# Patient Record
Sex: MALE | Race: WHITE | Employment: OTHER | URBAN - METROPOLITAN AREA
[De-identification: names, ages, dates, MRNs, and addresses within clinical notes are randomized per-mention and may not be internally consistent; named-entity substitution may affect disease eponyms.]

---

## 2017-07-03 ENCOUNTER — HOSPITAL ENCOUNTER (OUTPATIENT)
Dept: CT IMAGING | Age: 82
Discharge: HOME | End: 2017-07-03
Attending: INTERNAL MEDICINE
Payer: MEDICARE

## 2017-07-03 VITALS — HEIGHT: 73 IN | BODY MASS INDEX: 24.78 KG/M2 | WEIGHT: 187 LBS

## 2017-07-03 DIAGNOSIS — I71.21 ASCENDING AORTIC ANEURYSM: ICD-10-CM

## 2017-07-03 LAB — CREAT BLD-MCNC: 1 MG/DL (ref 0.6–1)

## 2017-07-03 PROCEDURE — 71275 CT ANGIOGRAPHY CHEST: CPT

## 2017-07-03 PROCEDURE — 74011000258 HC RX REV CODE- 258: Performed by: INTERNAL MEDICINE

## 2017-07-03 PROCEDURE — 82565 ASSAY OF CREATININE: CPT

## 2017-07-03 PROCEDURE — 74011636320 HC RX REV CODE- 636/320: Performed by: INTERNAL MEDICINE

## 2017-07-03 RX ORDER — SODIUM CHLORIDE 0.9 % (FLUSH) 0.9 %
10 SYRINGE (ML) INJECTION
Status: COMPLETED | OUTPATIENT
Start: 2017-07-03 | End: 2017-07-03

## 2017-07-03 RX ADMIN — IOPAMIDOL 125 ML: 755 INJECTION, SOLUTION INTRAVENOUS at 08:30

## 2017-07-03 RX ADMIN — SODIUM CHLORIDE 100 ML: 900 INJECTION, SOLUTION INTRAVENOUS at 08:30

## 2017-07-03 RX ADMIN — Medication 10 ML: at 08:30

## 2017-08-31 PROBLEM — Z01.818 PRE-OP TESTING: Status: ACTIVE | Noted: 2017-08-31

## 2017-09-07 ENCOUNTER — HOSPITAL ENCOUNTER (OUTPATIENT)
Dept: CARDIAC CATH/INVASIVE PROCEDURES | Age: 82
Discharge: HOME | End: 2017-09-07
Attending: INTERNAL MEDICINE | Admitting: INTERNAL MEDICINE
Payer: MEDICARE

## 2017-09-07 VITALS
SYSTOLIC BLOOD PRESSURE: 124 MMHG | BODY MASS INDEX: 25.18 KG/M2 | DIASTOLIC BLOOD PRESSURE: 75 MMHG | RESPIRATION RATE: 16 BRPM | HEART RATE: 81 BPM | HEIGHT: 73 IN | WEIGHT: 190 LBS | OXYGEN SATURATION: 94 % | TEMPERATURE: 98.4 F

## 2017-09-07 PROBLEM — I71.20 THORACIC AORTIC ANEURYSM WITHOUT RUPTURE: Status: ACTIVE | Noted: 2017-09-07

## 2017-09-07 LAB
ANION GAP SERPL CALC-SCNC: 6 MMOL/L (ref 7–16)
ATRIAL RATE: 82 BPM
BUN SERPL-MCNC: 20 MG/DL (ref 8–23)
CALCIUM SERPL-MCNC: 8.8 MG/DL (ref 8.3–10.4)
CALCULATED R AXIS, ECG10: -83 DEGREES
CALCULATED T AXIS, ECG11: 90 DEGREES
CHLORIDE SERPL-SCNC: 105 MMOL/L (ref 98–107)
CO2 SERPL-SCNC: 28 MMOL/L (ref 21–32)
CREAT SERPL-MCNC: 0.95 MG/DL (ref 0.8–1.5)
DIAGNOSIS, 93000: NORMAL
ERYTHROCYTE [DISTWIDTH] IN BLOOD BY AUTOMATED COUNT: 16.7 % (ref 11.9–14.6)
GLUCOSE SERPL-MCNC: 101 MG/DL (ref 65–100)
HCT VFR BLD AUTO: 34.8 % (ref 41.1–50.3)
HGB BLD-MCNC: 12.2 G/DL (ref 13.6–17.2)
INR PPP: 1.2 (ref 0.9–1.2)
MAGNESIUM SERPL-MCNC: 2 MG/DL (ref 1.8–2.4)
MCH RBC QN AUTO: 32 PG (ref 26.1–32.9)
MCHC RBC AUTO-ENTMCNC: 35.1 G/DL (ref 31.4–35)
MCV RBC AUTO: 91.3 FL (ref 79.6–97.8)
P-R INTERVAL, ECG05: 248 MS
PLATELET # BLD AUTO: 184 K/UL (ref 150–450)
PMV BLD AUTO: 9.4 FL (ref 10.8–14.1)
POTASSIUM SERPL-SCNC: 3.8 MMOL/L (ref 3.5–5.1)
PROTHROMBIN TIME: 13.5 SEC (ref 9.6–12)
Q-T INTERVAL, ECG07: 480 MS
QRS DURATION, ECG06: 174 MS
QTC CALCULATION (BEZET), ECG08: 560 MS
RBC # BLD AUTO: 3.81 M/UL (ref 4.23–5.67)
SODIUM SERPL-SCNC: 139 MMOL/L (ref 136–145)
VENTRICULAR RATE, ECG03: 82 BPM
WBC # BLD AUTO: 4.1 K/UL (ref 4.3–11.1)

## 2017-09-07 PROCEDURE — 77030029997 HC DEV COM RDL R BND TELE -B

## 2017-09-07 PROCEDURE — C1769 GUIDE WIRE: HCPCS

## 2017-09-07 PROCEDURE — 77030004559 HC CATH ANGI DX SUPT CARD -B

## 2017-09-07 PROCEDURE — 74011250637 HC RX REV CODE- 250/637: Performed by: INTERNAL MEDICINE

## 2017-09-07 PROCEDURE — 74011000250 HC RX REV CODE- 250: Performed by: INTERNAL MEDICINE

## 2017-09-07 PROCEDURE — 85610 PROTHROMBIN TIME: CPT | Performed by: INTERNAL MEDICINE

## 2017-09-07 PROCEDURE — 93458 L HRT ARTERY/VENTRICLE ANGIO: CPT

## 2017-09-07 PROCEDURE — 99152 MOD SED SAME PHYS/QHP 5/>YRS: CPT

## 2017-09-07 PROCEDURE — 74011636320 HC RX REV CODE- 636/320: Performed by: INTERNAL MEDICINE

## 2017-09-07 PROCEDURE — 77030004534 HC CATH ANGI DX INFN CARD -A

## 2017-09-07 PROCEDURE — 83735 ASSAY OF MAGNESIUM: CPT | Performed by: INTERNAL MEDICINE

## 2017-09-07 PROCEDURE — 93005 ELECTROCARDIOGRAM TRACING: CPT | Performed by: INTERNAL MEDICINE

## 2017-09-07 PROCEDURE — 74011250636 HC RX REV CODE- 250/636: Performed by: INTERNAL MEDICINE

## 2017-09-07 PROCEDURE — 74011250636 HC RX REV CODE- 250/636

## 2017-09-07 PROCEDURE — C1760 CLOSURE DEV, VASC: HCPCS

## 2017-09-07 PROCEDURE — 76937 US GUIDE VASCULAR ACCESS: CPT

## 2017-09-07 PROCEDURE — 77030013687 HC GD NDL BARD -B

## 2017-09-07 PROCEDURE — 85027 COMPLETE CBC AUTOMATED: CPT | Performed by: INTERNAL MEDICINE

## 2017-09-07 PROCEDURE — C1894 INTRO/SHEATH, NON-LASER: HCPCS

## 2017-09-07 PROCEDURE — 99153 MOD SED SAME PHYS/QHP EA: CPT

## 2017-09-07 PROCEDURE — 77030004558 HC CATH ANGI DX SUPR TORQ CARD -A

## 2017-09-07 PROCEDURE — 80048 BASIC METABOLIC PNL TOTAL CA: CPT | Performed by: INTERNAL MEDICINE

## 2017-09-07 RX ORDER — DIAZEPAM 5 MG/1
5 TABLET ORAL ONCE
Status: DISCONTINUED | OUTPATIENT
Start: 2017-09-07 | End: 2017-09-07 | Stop reason: HOSPADM

## 2017-09-07 RX ORDER — HEPARIN SODIUM 200 [USP'U]/100ML
3 INJECTION, SOLUTION INTRAVENOUS CONTINUOUS
Status: DISCONTINUED | OUTPATIENT
Start: 2017-09-07 | End: 2017-09-07

## 2017-09-07 RX ORDER — FENTANYL CITRATE 50 UG/ML
25-100 INJECTION, SOLUTION INTRAMUSCULAR; INTRAVENOUS
Status: DISCONTINUED | OUTPATIENT
Start: 2017-09-07 | End: 2017-09-07

## 2017-09-07 RX ORDER — MIDAZOLAM HYDROCHLORIDE 1 MG/ML
.5-2 INJECTION, SOLUTION INTRAMUSCULAR; INTRAVENOUS
Status: DISCONTINUED | OUTPATIENT
Start: 2017-09-07 | End: 2017-09-07

## 2017-09-07 RX ORDER — GUAIFENESIN 100 MG/5ML
324 LIQUID (ML) ORAL ONCE
Status: COMPLETED | OUTPATIENT
Start: 2017-09-07 | End: 2017-09-07

## 2017-09-07 RX ORDER — SODIUM CHLORIDE 9 MG/ML
75 INJECTION, SOLUTION INTRAVENOUS CONTINUOUS
Status: DISCONTINUED | OUTPATIENT
Start: 2017-09-07 | End: 2017-09-07 | Stop reason: HOSPADM

## 2017-09-07 RX ORDER — LIDOCAINE HYDROCHLORIDE 20 MG/ML
60-200 INJECTION, SOLUTION INFILTRATION; PERINEURAL
Status: DISCONTINUED | OUTPATIENT
Start: 2017-09-07 | End: 2017-09-07

## 2017-09-07 RX ADMIN — LIDOCAINE HYDROCHLORIDE 60 MG: 20 INJECTION, SOLUTION INFILTRATION; PERINEURAL at 13:11

## 2017-09-07 RX ADMIN — SODIUM CHLORIDE 75 ML/HR: 900 INJECTION, SOLUTION INTRAVENOUS at 10:01

## 2017-09-07 RX ADMIN — IOPAMIDOL 150 ML: 755 INJECTION, SOLUTION INTRAVENOUS at 13:51

## 2017-09-07 RX ADMIN — ASPIRIN 81 MG 324 MG: 81 TABLET ORAL at 10:04

## 2017-09-07 RX ADMIN — HEPARIN SODIUM 3 UNITS/HR: 200 INJECTION, SOLUTION INTRAVENOUS at 13:07

## 2017-09-07 RX ADMIN — LIDOCAINE HYDROCHLORIDE 200 MG: 20 INJECTION, SOLUTION INFILTRATION; PERINEURAL at 13:23

## 2017-09-07 RX ADMIN — MIDAZOLAM HYDROCHLORIDE 2 MG: 1 INJECTION, SOLUTION INTRAMUSCULAR; INTRAVENOUS at 13:06

## 2017-09-07 RX ADMIN — FENTANYL CITRATE 25 MCG: 50 INJECTION, SOLUTION INTRAMUSCULAR; INTRAVENOUS at 13:06

## 2017-09-07 RX ADMIN — MIDAZOLAM HYDROCHLORIDE 2 MG: 1 INJECTION, SOLUTION INTRAMUSCULAR; INTRAVENOUS at 13:22

## 2017-09-07 RX ADMIN — HEPARIN SODIUM 2 ML: 10000 INJECTION, SOLUTION INTRAVENOUS; SUBCUTANEOUS at 13:12

## 2017-09-07 RX ADMIN — FENTANYL CITRATE 25 MCG: 50 INJECTION, SOLUTION INTRAMUSCULAR; INTRAVENOUS at 13:23

## 2017-09-07 RX ADMIN — HEPARIN SODIUM 3 UNITS/HR: 200 INJECTION, SOLUTION INTRAVENOUS at 13:06

## 2017-09-07 NOTE — PROCEDURES
Brief Cardiac Procedure Note    Patient: Marco Siddiqi MRN: [id number]  SSN: [id number]    YOB: 1935  Age: 80 y.o. Sex: male      Date of Procedure: 9/7/2017     Pre-procedure Diagnosis: Aortic aneurysm. Post-procedure Diagnosis: Mild non-obstructive Coronary Artery Disease    Procedure: Left Heart Catheterization    Brief Description of Procedure: As above    Performed By: Zeyad Mohan MD     Assistants: None    Anesthesia: Moderate Sedation    Estimated Blood Loss: Less than 10 mL      Specimens: None    Implants: None    Findings: Mild non-obstructive CAD. EF 40%. Complications: None    Recommendations: Continue medical therapy.     Signed By: Zeyad Mohan MD     September 7, 2017

## 2017-09-07 NOTE — DISCHARGE INSTRUCTIONS
HEART CATHETERIZATION/ANGIOGRAPHY DISCHARGE INSTRUCTIONS    1. Check puncture site frequently for swelling or bleeding. If there is any bleeding, lie down and apply pressure over the area with a clean towel or washcloth and call [phone]. Notify your doctor for any redness, swelling, drainage, or oozing from the puncture site. Notify your doctor for any fever or chills. 2. If the extremity becomes cold, numb, or painful call [facility] Cardiology at [phone].  3. Activity should be limited for the next 48 hours. Climb stairs as little as possible and avoid any stooping, bending, or strenuous activity for 48 hours. No heavy lifting (anything over 10 pounds) for 3 days. 4. You may resume your usual diet. Drink more fluids than usual.  5. Have a responsible person drive you home and stay with you for at least 24 hours after your heart catheterization/angiography. 6. You may remove bandage from your Right wrist and groin in 24 hours. You may shower in 24 hours. No tub baths, hot tubs, or swimming for 1 week. Do not place any lotions, creams, powders, or ointments over puncture site for 1 week. You may place a clean band-aid over the puncture site each day for 5 days. Change daily. Follow up with Dr. Elissa George at [facility] office on October 4th at [address], please call [phone] if any issues with date or time of appointment. I have read the above instructions and have had the opportunity to ask questions.       Patient: ________________________   Date: 9/7/2017    Witness: _______________________   Date: 9/7/2017

## 2017-09-07 NOTE — H&P
[facility] CARDIOLOGY History & Physical                   Subjective:     Patient is a 80 y.o. male who presents for coronary angiogram.  Patient has been followed by Dr. Elier Dunham. He has a history of prior mitral valve replacement. Workup has revealed significant tricuspid regurgitation and a thoracic aortic aneurysm measuring 6.3 cm. He has been seen at [facility] by Dr. Antony Chavez and is planned to have tricuspid valve repair and aortic aneurysm repair later this month. Preoperative cardiac catheterization has been requested. He does have LV dysfunction and a prior biventricular ICD. He currently denies any chest pain or dyspnea. Past Medical History:   Diagnosis Date    Arthritis     Atrial septal defect/Patent Foramen Ovale 1/11/2016    Cardiac pacemaker 1/11/2016    High-degree AV block with high percentage of right ventricular pacing on pacemaker.      Cardiomyopathy ([facility] 1/11/2016    Chronic systolic congestive heart failure ([facility] 1/11/2016    NYHA III; diagnosed 10/14/15; 1/25/16 EF 30-35%, LVd 6.3cm; mild AI    Fatigue     HLD (hyperlipidemia) 1/11/2016    Permanent atrial fibrillation ([facility] 1/11/2016    Stroke [facility])     TIA- 1990's    Thoracic aortic aneurysm without rupture ([facility] 9/7/2017    Ventricular premature beats, Depolarization 1/11/2016      Past Surgical History:   Procedure Laterality Date    HX HEART CATHETERIZATION Left     No significant CAD    HX MITRAL VALVE REPLACEMENT  2011    bioprosthetic with MAZE and JENNIFER ligation    HX ORTHOPAEDIC Left     Shoulder surgery    HX OTHER SURGICAL  1/2011    s/p Maze, JENNIFER ligation, and PFO closure    HX PACEMAKER  1/24/2011    PM 2011; upgrade to [facility]. Zane BiV ICD 2/16/16      No Known Allergies  Social History   Substance Use Topics    Smoking status: Former Smoker     Years: 15.00    Smokeless tobacco: Never Used    Alcohol use No      Comment: Approx 2 drinks per day (refraining from alcohol for 3 months)      FH:   Family History Problem Relation Age of Onset    Arrhythmia Mother      Afib    Heart Disease Mother     No Known Problems Father         Review of Systems   Constitutional: Negative for chills and fever. HENT: Negative for tinnitus. Eyes: Negative for blurred vision. Respiratory: Negative for cough and shortness of breath. Cardiovascular: Negative for orthopnea and leg swelling. Gastrointestinal: Negative for abdominal pain and nausea. Genitourinary: Negative for dysuria. Musculoskeletal: Positive for joint pain. Skin: Negative for rash. Neurological: Negative for tremors, sensory change and headaches. Endo/Heme/Allergies: Does not bruise/bleed easily. Psychiatric/Behavioral: Negative for depression and suicidal ideas. Objective:       Visit Vitals    BP (!) 148/119 (BP Patient Position: At rest)    Pulse 82    Temp 98.4 °F (36.9 °C)    Resp 16    Ht 6' 1\" (1.854 m)    Wt 86.2 kg (190 lb)    SpO2 96%    BMI 25.07 kg/m2               Physical Exam   Constitutional: He is oriented to person, place, and time and well-developed, well-nourished, and in no distress. HENT:   Head: Atraumatic. Eyes: Conjunctivae are normal. Pupils are equal, round, and reactive to light. Neck: No JVD present. No thyromegaly present. Cardiovascular: Normal rate and regular rhythm. Murmur heard. Pulmonary/Chest: Effort normal and breath sounds normal. Wheezes: Grade II/VI LOLITA. Abdominal: Soft. Bowel sounds are normal. He exhibits no distension. There is no tenderness. Musculoskeletal: He exhibits no edema. Neurological: He is alert and oriented to person, place, and time. Skin: Skin is warm. No rash noted.    Psychiatric: Mood normal.           Data Review:   Labs:   Recent Labs      09/07/17   0925   NA  139   K  3.8   MG  2.0   BUN  20   CREA  0.95   GLU  101*   WBC  4.1*   HGB  12.2*   HCT  34.8*   PLT  184   INR  1.2      No results found for: TROIQ, CAMILO, TROPT, [facility]        Assessment/Plan:   Principal Problem:    Thoracic aortic aneurysm without rupture ([facility] (9/7/2017)  Will proceed with [facility]. Discussed risk of cardiac catheterization with patient in detail. The risk of a major complication (death, MI or major embolization) during or after cardiac catheterization is below 1%. Risk of mortality is under 0.1%. Local complications at the site of catheter insertion were discussed. This includes but not limited to:  acute thrombosis, distal embolization, dissection, poorly controlled bleeding, hematoma, retroperitoneal hemorrhage, pseudoaneurysm or AV fistula formation. Other potential serious complication were discussed including risk of cardiac arrhythmias, allergic reactions, atheroemboli and acute kidney injury. Active Problems:    NICM (nonischemic cardiomyopathy) ([facility] (2/16/2016)  Assess at cath      Biventricular ICD (implantable cardioverter-defibrillator) in place (2/16/2016)   Noted. Followed in ICD clinic. History of mitral valve replacement with bioprosthetic valve (2/16/2016)  Stable.                ASHLY Camacho  9/7/2017  12:05 PM

## 2017-09-07 NOTE — PROCEDURES
[address]       Name:  Alana Bazan   MR#:  [id number]   :  1935   Account #:  [de-identified]   Date of Adm:  2017       PROCEDURES: Left heart catheterization, selective coronary   arteriography, left ventriculogram via the right femoral   approach. Unable to engage coronary arteries from the right   radial approach. INDICATION: This patient needs aortic aneurysm repair. Preoperative cardiac catheterization requested by Dr. Elier Dunham. TECHNICAL FACTORS: After informed consent was obtained, the   patient was brought to the cardiac catheterization lab. The   right radial artery was prepped and draped in the usual sterile   fashion. Utilizing modified Seldinger technique and   micropuncture needle, right radial artery was entered. A 6-  Japanese Terumo Slender sheath was placed. A radial cocktail   consisting of 2000 units of heparin, 2 mg of verapamil, 200 mcg   of nitroglycerin was administered. A 5-Japanese JL6 catheter was   advanced, but the patient has severe subclavian tortuosity,   and I could not engage the coronary arteries. At this point, the   right femoral artery was prepped and draped in the usual sterile   fashion. Using Site-Rite ultrasound, the right common femoral   artery was entered. A 6-Japanese arterial sheath was placed under   ultrasound guidance. A static image of the artery was placed on   the chart. A 5-Japanese JL6 catheter was used to selectively   engage the ostium of left main coronary artery, but poor   visualization was noted. This was then replaced with a 6-Japanese   JL6 catheter with improved visualization. A multipurpose   catheter was advanced, but could not engage the ostium of right   coronary artery. This was ultimately engaged with an AL1   catheter. Selective injections in various projections were   performed. A pigtail catheter was used to cross the aortic valve   and enter the left ventricle.  Hemodynamic measurements and left   ventriculogram were obtained. Left ventricular aortic pressure   gradient was obtained by pullback technique. At the conclusion of the diagnostic procedure, an arteriogram   was performed via the right femoral sheath, which showed the   sheath was contained in the right common femoral artery. A 6-  Icelandic Mynx vasculature closure device was deployed by standard   technique with excellent hemostasis. No complications were   encountered. SEDATION: The patient received 4 mg of Versed and 50 mcg of   fentanyl. The sedation was administered by Anibal Bhardwaj RN, under   my supervision. Sedation start time was 1306, with a procedure   end time of 1402. CONTRAST: Isovue 150. HEMODYNAMIC RESULTS   1. Aortic pressure 110/66. 2. Left ventricular end-diastolic pressure is 8. ANGIOGRAPHIC RESULTS   1. Left main coronary artery: Large caliber vessel, 10% to 20%   mid stenosis. 2. LAD: It is a medium caliber vessel, 10% to 20% mid stenosis. 3. Ramus intermediate: It is a medium caliber vessel, 10% to 20%   proximal and mid stenosis. 4. Left circumflex is a small to medium caliber, nondominant   vessel. Normal.   5. First obtuse marginal artery: It is a medium caliber vessel,   20% proximal stenosis. 6. Right coronary artery: Large caliber dominant vessel, 20%   proximal, 20% mid and 30% distal stenosis. 7. Right PDA: Medium to large caliber vessel. Patent. 8. Right posterolateral branch: Medium caliber vessel. Contains   20% lesions in the first and second right posterolateral branch. 9. Left ventriculogram performed in BEVERLY projection shows dilated   left ventricle with EF 40%. CONCLUSION   1. Mild nonobstructive coronary artery disease. 2. Mildly reduced left ventricular systolic function. 3. Successful deployment of 6-Icelandic Mynx vascular closure   device for hemostasis of the arteriotomy site. PLAN: CT surgery evaluation which is ongoing with Dr. Dheeraj Singh.  Will send a copy of these catheterization films to Dr. Davila [facility] office for review.         Jesusita Fox, MD CRABTREE / CHRISTOFER   D:  09/07/2017   14:02   T:  09/07/2017   14:21   Job #:  [id number]

## 2017-09-07 NOTE — IP AVS SNAPSHOT
[address] [id number] [address] 
[phone] Patient: Nani Miller 
MRN: [id number] :1935 Discharge Summary 2017 Nani Miller  
 MRN[de-identified]  [id number] Admission Information Provider Pager Service Admission Date Expected D/C Date Rick Bain MD  CARDIAC CATH LAB 2017 Actual LOS Patient Class 0 days OUTPATIENT Follow-up Information Follow up With Details Comments Contact Info Lucy Palmer MD On 10/4/2017 Follow up with Dr. Meño Moreland at [facility] office on  [facility], please call [phone] if any issues with date or time of appointment. [address] [place] [id number] 
[phone] Current Discharge Medication List  
  
ASK your doctor about these medications Dose & Instructions Dispensing Information Comments Morning Noon Evening Bedtime  
 amiodarone 200 mg tablet Commonly known as:  CORDARONE Your last dose was: Your next dose is:    
   
   
 Dose:  200 mg Take 1 Tab by mouth daily. Quantity:  90 Tab Refills:  3  
     
   
   
   
  
 cyanocobalamin 1,000 mcg tablet Your last dose was: Your next dose is:    
   
   
 Dose:  1000 mcg Take 1,000 mcg by mouth daily. Refills:  0  
     
   
   
   
  
 losartan 25 mg tablet Commonly known as:  COZAAR Your last dose was: Your next dose is:    
   
   
 Dose:  12.5 mg Take 0.5 Tabs by mouth daily. Quantity:  45 Tab Refills:  3  
     
   
   
   
  
 magnesium oxide 400 mg tablet Commonly known as:  MAG-OX Your last dose was: Your next dose is:    
   
   
 Dose:  400 mg Take 400 mg by mouth daily. Refills:  0  
     
   
   
   
  
 metoprolol succinate 100 mg tablet Commonly known as:  TOPROL-XL Your last dose was:     
   
Your next dose is:    
   
   
 Dose:  100 mg  
 Take 1 Tab by mouth daily. Quantity:  90 Tab Refills:  3 OYSCO-500 500 mg calcium (1,250 mg) tablet Generic drug:  calcium carbonate Your last dose was: Your next dose is:    
   
   
 Dose:  1 Tab Take 1 Tab by mouth daily. Refills:  0  
     
   
   
   
  
 spironolactone 25 mg tablet Commonly known as:  ALDACTONE Your last dose was: Your next dose is:    
   
   
 Dose:  12.5 mg Take 0.5 Tabs by mouth daily. Quantity:  90 Tab Refills:  1 VITAMIN D3 1,000 unit tablet Generic drug:  cholecalciferol Your last dose was: Your next dose is:    
   
   
 Dose:  5000 Units Take 5,000 Units by mouth daily. Refills:  0  
     
   
   
   
  
 warfarin 4 mg tablet Commonly known as:  COUMADIN Your last dose was: Your next dose is:    
   
   
 Dose:  4 mg Take 1 Tab by mouth nightly. Quantity:  30 Tab Refills:  6 General Information Please provide this summary of care documentation to your next provider. Allergies No Known Allergies Current Immunizations  Never Reviewed No immunizations on file. Discharge Instructions Discharge Instructions HEART CATHETERIZATION/ANGIOGRAPHY DISCHARGE INSTRUCTIONS 1. Check puncture site frequently for swelling or bleeding. If there is any bleeding, lie down and apply pressure over the area with a clean towel or washcloth and call [phone]. Notify your doctor for any redness, swelling, drainage, or oozing from the puncture site. Notify your doctor for any fever or chills. 2. If the extremity becomes cold, numb, or painful call [facility] Cardiology at [phone]. 
3. Activity should be limited for the next 48 hours.  Climb stairs as little as possible and avoid any stooping, bending, or strenuous activity for 48 hours. No heavy lifting (anything over 10 pounds) for 3 days. 4. You may resume your usual diet. Drink more fluids than usual. 
5. Have a responsible person drive you home and stay with you for at least 24 hours after your heart catheterization/angiography. 6. You may remove bandage from your Right wrist and groin in 24 hours. You may shower in 24 hours. No tub baths, hot tubs, or swimming for 1 week. Do not place any lotions, creams, powders, or ointments over puncture site for 1 week. You may place a clean band-aid over the puncture site each day for 5 days. Change daily. Follow up with [facility]. Cesilia Ruiz [facility], please call [phone] if any issues with date or time of appointment. I have read the above instructions and have had the opportunity to ask questions. Patient: ________________________   Date: 9/7/2017 Witness: _______________________   Date: 9/7/2017 Discharge Orders None  
  
` Patient Signature:  ____________________________________________________________ Date:  ____________________________________________________________  
  
 Larri Hazard Provider Signature:  ____________________________________________________________ Date:  ____________________________________________________________

## 2021-06-23 NOTE — PROGRESS NOTES
mg at 1004 am
Patient up to bedside, vital signs and site stable. Patient ambulated to bathroom without difficulty. Patient voided without difficulty. Vascular site stable. 1545 Discharge instructions and home medications reviewed with patient. Time allowed for questions and answers. 1553 Patient ambulated second time without difficulty. Site stable after ambulation. Peripheral IV sites dc'd without difficulty with tips intact. 1630 Patient discharged to home with family. Patient up to bedside, vital signs and site stable. Patient ambulated to bathroom without difficulty. Patient voided without difficulty. Vascular site stable.
Patient's head of bed elevated 90 degrees, patient given beverage and meal tray. No apparent distress noted, call light within reach.
Report received from Jannette Castro. Procedural findings communicated. Intra procedural  medication administration reviewed. Progression of care discussed.      Patient received into [address] [facility] 7 post sheath removal.     Access site without bleeding or swelling yes    Dressing dry and intact yes    Patient instructed to limit movement to right upper, lower extremity    Routine post procedural vital signs and site assessment initiated yes
TRANSFER - OUT REPORT:    Verbal report given to Micah Astudillo RN (name) on Magdalene Gary  being transferred to [address] [place] [address] ([facility]) for routine progression of care       Report consisted of patients Situation, Background, Assessment and   Recommendations(SBAR). Information from the following report(s) SBAR was reviewed with the receiving nurse. Lines:       Opportunity for questions and clarification was provided. Patient transported with:   Tech         Pt had [address] via R groin. Site closed with Mynx and Tegaderm. Also attempted access via R radial approach and site closed with TR band and 10mL @ 1357. Pt received Versed 4mg IV and Fentanyl 50mcg IV.
see chief complaint quote